# Patient Record
Sex: MALE | Race: WHITE | NOT HISPANIC OR LATINO | Employment: OTHER | URBAN - METROPOLITAN AREA
[De-identification: names, ages, dates, MRNs, and addresses within clinical notes are randomized per-mention and may not be internally consistent; named-entity substitution may affect disease eponyms.]

---

## 2017-07-22 ENCOUNTER — HOSPITAL ENCOUNTER (EMERGENCY)
Facility: HOSPITAL | Age: 42
Discharge: HOME | End: 2017-07-22
Attending: EMERGENCY MEDICINE
Payer: COMMERCIAL

## 2017-07-22 VITALS
BODY MASS INDEX: 36.49 KG/M2 | OXYGEN SATURATION: 98 % | SYSTOLIC BLOOD PRESSURE: 142 MMHG | RESPIRATION RATE: 16 BRPM | HEART RATE: 84 BPM | HEIGHT: 65 IN | DIASTOLIC BLOOD PRESSURE: 84 MMHG | TEMPERATURE: 99 F | WEIGHT: 219 LBS

## 2017-07-22 DIAGNOSIS — R29.818 NEUROLOGIC ABNORMALITY: ICD-10-CM

## 2017-07-22 DIAGNOSIS — R21 SKIN RASH: Primary | ICD-10-CM

## 2017-07-22 DIAGNOSIS — R52 PAIN: ICD-10-CM

## 2017-07-22 DIAGNOSIS — R41.82 ALTERED MENTAL STATE: ICD-10-CM

## 2017-07-22 LAB
ALBUMIN SERPL BCP-MCNC: 3.4 G/DL
ALP SERPL-CCNC: 90 U/L
ALT SERPL W/O P-5'-P-CCNC: 31 U/L
AMPHET+METHAMPHET UR QL: NORMAL
ANION GAP SERPL CALC-SCNC: 8 MMOL/L
AST SERPL-CCNC: 20 U/L
BACTERIA #/AREA URNS HPF: NORMAL /HPF
BARBITURATES UR QL SCN>200 NG/ML: NEGATIVE
BASOPHILS # BLD AUTO: 0.02 K/UL
BASOPHILS NFR BLD: 0.3 %
BENZODIAZ UR QL SCN>200 NG/ML: NORMAL
BILIRUB SERPL-MCNC: 0.2 MG/DL
BILIRUB UR QL STRIP: NEGATIVE
BUN SERPL-MCNC: 17 MG/DL
BZE UR QL SCN: NEGATIVE
CALCIUM SERPL-MCNC: 9 MG/DL
CANNABINOIDS UR QL SCN: NEGATIVE
CHLORIDE SERPL-SCNC: 105 MMOL/L
CLARITY UR: CLEAR
CO2 SERPL-SCNC: 29 MMOL/L
COLOR UR: YELLOW
CREAT SERPL-MCNC: 1.1 MG/DL
CREAT UR-MCNC: 141.3 MG/DL
DIFFERENTIAL METHOD: ABNORMAL
EOSINOPHIL # BLD AUTO: 0.1 K/UL
EOSINOPHIL NFR BLD: 2 %
ERYTHROCYTE [DISTWIDTH] IN BLOOD BY AUTOMATED COUNT: 13.4 %
EST. GFR  (AFRICAN AMERICAN): >60 ML/MIN/1.73 M^2
EST. GFR  (NON AFRICAN AMERICAN): >60 ML/MIN/1.73 M^2
GLUCOSE SERPL-MCNC: 89 MG/DL
GLUCOSE UR QL STRIP: NEGATIVE
HCT VFR BLD AUTO: 36 %
HGB BLD-MCNC: 13 G/DL
HGB UR QL STRIP: NEGATIVE
HIV1+2 IGG SERPL QL IA.RAPID: NEGATIVE
KETONES UR QL STRIP: NEGATIVE
LEUKOCYTE ESTERASE UR QL STRIP: ABNORMAL
LYMPHOCYTES # BLD AUTO: 2 K/UL
LYMPHOCYTES NFR BLD: 31.3 %
MCH RBC QN AUTO: 31.6 PG
MCHC RBC AUTO-ENTMCNC: 36.1 G/DL
MCV RBC AUTO: 88 FL
METHADONE UR QL SCN>300 NG/ML: NORMAL
MICROSCOPIC COMMENT: NORMAL
MONOCYTES # BLD AUTO: 0.6 K/UL
MONOCYTES NFR BLD: 8.8 %
NEUTROPHILS # BLD AUTO: 3.7 K/UL
NEUTROPHILS NFR BLD: 58.4 %
NITRITE UR QL STRIP: NEGATIVE
OPIATES UR QL SCN: NORMAL
PCP UR QL SCN>25 NG/ML: NEGATIVE
PH UR STRIP: 6 [PH] (ref 5–8)
PLATELET # BLD AUTO: 121 K/UL
PLATELET BLD QL SMEAR: ABNORMAL
PMV BLD AUTO: 10 FL
POTASSIUM SERPL-SCNC: 3.5 MMOL/L
PROT SERPL-MCNC: 7 G/DL
PROT UR QL STRIP: NEGATIVE
RBC # BLD AUTO: 4.11 M/UL
RBC #/AREA URNS HPF: 0 /HPF (ref 0–4)
RPR SER QL: NORMAL
SODIUM SERPL-SCNC: 142 MMOL/L
SP GR UR STRIP: 1.02 (ref 1–1.03)
SQUAMOUS #/AREA URNS HPF: 0 /HPF
STOMATOCYTES BLD QL SMEAR: PRESENT
TOXICOLOGY INFORMATION: NORMAL
URN SPEC COLLECT METH UR: ABNORMAL
UROBILINOGEN UR STRIP-ACNC: NEGATIVE EU/DL
WBC # BLD AUTO: 6.48 K/UL
WBC #/AREA URNS HPF: 0 /HPF (ref 0–5)

## 2017-07-22 PROCEDURE — 86703 HIV-1/HIV-2 1 RESULT ANTBDY: CPT

## 2017-07-22 PROCEDURE — 80061 LIPID PANEL: CPT

## 2017-07-22 PROCEDURE — 86592 SYPHILIS TEST NON-TREP QUAL: CPT

## 2017-07-22 PROCEDURE — 80053 COMPREHEN METABOLIC PANEL: CPT

## 2017-07-22 PROCEDURE — 85025 COMPLETE CBC W/AUTO DIFF WBC: CPT

## 2017-07-22 PROCEDURE — 81000 URINALYSIS NONAUTO W/SCOPE: CPT

## 2017-07-22 PROCEDURE — 25500020 PHARM REV CODE 255: Performed by: EMERGENCY MEDICINE

## 2017-07-22 PROCEDURE — 99284 EMERGENCY DEPT VISIT MOD MDM: CPT

## 2017-07-22 PROCEDURE — 80307 DRUG TEST PRSMV CHEM ANLYZR: CPT

## 2017-07-22 RX ORDER — OMEPRAZOLE 20 MG/1
20 CAPSULE, DELAYED RELEASE ORAL DAILY
COMMUNITY

## 2017-07-22 RX ORDER — OXYCODONE HCL 10 MG/1
10 TABLET, FILM COATED, EXTENDED RELEASE ORAL EVERY 4 HOURS PRN
COMMUNITY

## 2017-07-22 RX ORDER — CITALOPRAM 20 MG/1
20 TABLET, FILM COATED ORAL 2 TIMES DAILY
COMMUNITY

## 2017-07-22 RX ORDER — DEXTROAMPHETAMINE SACCHARATE, AMPHETAMINE ASPARTATE MONOHYDRATE, DEXTROAMPHETAMINE SULFATE AND AMPHETAMINE SULFATE 7.5; 7.5; 7.5; 7.5 MG/1; MG/1; MG/1; MG/1
30 CAPSULE, EXTENDED RELEASE ORAL 2 TIMES DAILY
COMMUNITY

## 2017-07-22 RX ORDER — LAMOTRIGINE 100 MG/1
100 TABLET ORAL 2 TIMES DAILY
COMMUNITY

## 2017-07-22 RX ORDER — ALPRAZOLAM 1 MG/1
1 TABLET ORAL DAILY PRN
COMMUNITY

## 2017-07-22 RX ADMIN — IOHEXOL 75 ML: 350 INJECTION, SOLUTION INTRAVENOUS at 12:07

## 2017-07-22 NOTE — ED NOTES
Pt stated that he lives in [place] and one year ago thorns on a bromeliad plant cut his fingers and the plant also had slugs on it. Soon after he began having neurological symptoms and wounds to lower left ext, bilat arms, right ear and scalp. Had wound vac placed to left lower leg at that time. Pt stated symptoms continue

## 2017-07-22 NOTE — ED PROVIDER NOTES
"SCRIBE #1 NOTE: I, Corinne Mack, am scribing for, and in the presence of, Jose Angel Blanchard Jr., MD. I have scribed the entire note.      History      Chief Complaint   Patient presents with    Abdominal Pain     patient c/o abdominal pain, diarrhea, bilateral arm rash, old wound to left ankle. Patient states he lives in [place] and has been battling infection for the last year, and referred to ED by Dr. Blanchard        Review of patient's allergies indicates:   Allergen Reactions    Statins-hmg-coa reductase inhibitors Other (See Comments)     MUSCLE PAIN         HPI   HPI    7/22/2017, 10:24 AM   History obtained from the patient      History of Present Illness: Abdon Rojas is a 42 y.o. male patient who presents to the Emergency Department for wound check. Pt states he was clearing his yard in [place] a year ago and cut his lower L leg. Since then whenever the pt experiencing a skin wound it ulcerates and itches. Pt describes seeing "black things" and "white worms" in his blood. Pt also reports episodic neurological sxs such as change in depth perception. Pt was advised by his NP to go to the "[facility]" to seek further medical help. Symptoms are constant and moderate in severity. No mitigating or exacerbating factors reported. Associated sxs include abd pain, diarrhea, BUE rash, and L foot pain. Patient denies any fever, chills, CP, SOB, N//V, back pain, HA, dizziness, and all other sxs at this time. No prior Tx reported. Pt has Hx of HTN, ADD, bipolar disorder, and depression. No further complaints or concerns at this time.       Arrival mode: Personal vehicle      PCP: [facility]       Past Medical History:  Past Medical History:   Diagnosis Date    ADD (attention deficit disorder)     Bipolar 1 disorder     Depression     GERD (gastroesophageal reflux disease)     Hypertension     Social anxiety disorder        Past Surgical History:  Past Surgical History:   Procedure Laterality Date    BACK " SURGERY      left shoulder surgery  2009         Family History:  No family history on file.    Social History:  Social History     Social History Main Topics    Smoking status: Current Every Day Smoker     Types: Vaping with nicotine    Smokeless tobacco: Never Used    Alcohol use No    Drug use:      Types: Marijuana    Sexual activity: Not on file       ROS   Review of Systems   Constitutional: Negative for chills and fever.   Respiratory: Negative for cough and shortness of breath.    Cardiovascular: Negative for chest pain and leg swelling.   Gastrointestinal: Positive for abdominal pain and diarrhea (mucousy). Negative for nausea and vomiting.   Musculoskeletal: Negative for back pain, neck pain and neck stiffness.        (+)  L foot pain   Skin: Positive for wound (old; BUE). Negative for rash.   Neurological: Negative for dizziness, light-headedness, numbness and headaches.   All other systems reviewed and are negative.    Physical Exam      Initial Vitals [07/22/17 0904]   BP Pulse Resp Temp SpO2   (!) 163/95 85 18 98.2 °F (36.8 °C) 99 %      MAP       117.67          Physical Exam  Nursing Notes and Vital Signs Reviewed.  Constitutional: Patient is in no acute distress. Well-developed and well-nourished.  Head: Atraumatic. Normocephalic.  Eyes: PERRL. EOM intact. Conjunctivae are not pale. No scleral icterus.  ENT: Mucous membranes are moist. Oropharynx is clear and symmetric.    Neck: Supple. Full ROM. No lymphadenopathy.  Cardiovascular: Regular rate. Regular rhythm. No murmurs, rubs, or gallops. Distal pulses are 2+ and symmetric.  Pulmonary/Chest: No respiratory distress. Clear to auscultation bilaterally. No wheezing, rales, or rhonchi.  Abdominal: Soft and non-distended.  There is no tenderness.   Musculoskeletal: Moves all extremities. No obvious deformities. 1+ edema to BLE. No calf tenderness.  Skin: Warm and dry.  Multiple areas of scarification to bilateral forearms. 1 area of  "scarification to L shin.  Neurological:  Alert, awake, and appropriate.  Normal speech.  No acute focal neurological deficits are appreciated.  Psychiatric: Normal affect. Good eye contact. Appropriate in content.    ED Course    Procedures  ED Vital Signs:  Vitals:    07/22/17 0904 07/22/17 1021 07/22/17 1303 07/22/17 1346   BP: (!) 163/95 (!) 148/84 (!) 142/84    Pulse: 85   84   Resp: 18  16 16   Temp: 98.2 °F (36.8 °C)  98.6 °F (37 °C)    TempSrc: Oral      SpO2: 99% 98% 98%    Weight: 99.3 kg (219 lb)      Height: 5' 5" (1.651 m)          Abnormal Lab Results:  Labs Reviewed   CBC W/ AUTO DIFFERENTIAL - Abnormal; Notable for the following:        Result Value    RBC 4.11 (*)     Hemoglobin 13.0 (*)     Hematocrit 36.0 (*)     MCH 31.6 (*)     MCHC 36.1 (*)     Platelets 121 (*)     Platelet Estimate Clumped (*)     All other components within normal limits   COMPREHENSIVE METABOLIC PANEL - Abnormal; Notable for the following:     Albumin 3.4 (*)     All other components within normal limits   URINALYSIS - Abnormal; Notable for the following:     Leukocytes, UA Trace (*)     All other components within normal limits   CULTURE, STOOL   RAPID HIV   RPR   DRUG SCREEN PANEL, URINE EMERGENCY   URINALYSIS MICROSCOPIC   STOOL EXAM-OVA,CYSTS,PARASITES   LIPID PANEL        All Lab Results:  Results for orders placed or performed during the hospital encounter of 07/22/17   CBC auto differential   Result Value Ref Range    WBC 6.48 3.90 - 12.70 K/uL    RBC 4.11 (L) 4.60 - 6.20 M/uL    Hemoglobin 13.0 (L) 14.0 - 18.0 g/dL    Hematocrit 36.0 (L) 40.0 - 54.0 %    MCV 88 82 - 98 fL    MCH 31.6 (H) 27.0 - 31.0 pg    MCHC 36.1 (H) 32.0 - 36.0 g/dL    RDW 13.4 11.5 - 14.5 %    Platelets 121 (L) 150 - 350 K/uL    MPV 10.0 9.2 - 12.9 fL    Gran # 3.7 1.8 - 7.7 K/uL    Lymph # 2.0 1.0 - 4.8 K/uL    Mono # 0.6 0.3 - 1.0 K/uL    Eos # 0.1 0.0 - 0.5 K/uL    Baso # 0.02 0.00 - 0.20 K/uL    Gran% 58.4 38.0 - 73.0 %    Lymph% 31.3 18.0 - " 48.0 %    Mono% 8.8 4.0 - 15.0 %    Eosinophil% 2.0 0.0 - 8.0 %    Basophil% 0.3 0.0 - 1.9 %    Platelet Estimate Clumped (A)     Stomatocytes Present     Differential Method Automated    Comprehensive metabolic panel   Result Value Ref Range    Sodium 142 136 - 145 mmol/L    Potassium 3.5 3.5 - 5.1 mmol/L    Chloride 105 95 - 110 mmol/L    CO2 29 23 - 29 mmol/L    Glucose 89 70 - 110 mg/dL    BUN, Bld 17 6 - 20 mg/dL    Creatinine 1.1 0.5 - 1.4 mg/dL    Calcium 9.0 8.7 - 10.5 mg/dL    Total Protein 7.0 6.0 - 8.4 g/dL    Albumin 3.4 (L) 3.5 - 5.2 g/dL    Total Bilirubin 0.2 0.1 - 1.0 mg/dL    Alkaline Phosphatase 90 55 - 135 U/L    AST 20 10 - 40 U/L    ALT 31 10 - 44 U/L    Anion Gap 8 8 - 16 mmol/L    eGFR if African American >60 >60 mL/min/1.73 m^2    eGFR if non African American >60 >60 mL/min/1.73 m^2   Rapid HIV   Result Value Ref Range    HIV Rapid Testing Negative Negative   RPR   Result Value Ref Range    RPR Non-reactive Non-reactive   Drug screen panel, emergency   Result Value Ref Range    Benzodiazepines Presumptive Positive     Methadone metabolites Presumptive Positive     Cocaine (Metab.) Negative     Opiate Scrn, Ur Presumptive Positive     Barbiturate Screen, Ur Negative     Amphetamine Screen, Ur Presumptive Positive     THC Negative     Phencyclidine Negative     Creatinine, Random Ur 141.3 23.0 - 375.0 mg/dL    Toxicology Information SEE COMMENT    Urinalysis   Result Value Ref Range    Specimen UA Urine, Clean Catch     Color, UA Yellow Yellow, Straw, Abby    Appearance, UA Clear Clear    pH, UA 6.0 5.0 - 8.0    Specific Gravity, UA 1.020 1.005 - 1.030    Protein, UA Negative Negative    Glucose, UA Negative Negative    Ketones, UA Negative Negative    Bilirubin (UA) Negative Negative    Occult Blood UA Negative Negative    Nitrite, UA Negative Negative    Urobilinogen, UA Negative <2.0 EU/dL    Leukocytes, UA Trace (A) Negative   Urinalysis Microscopic   Result Value Ref Range    RBC, UA 0 0  - 4 /hpf    WBC, UA 0 0 - 5 /hpf    Bacteria, UA None None-Occ /hpf    Squam Epithel, UA 0 /hpf    Microscopic Comment SEE COMMENT        Imaging Results:  Imaging Results          CT Abdomen Pelvis With Contrast (Final result)  Result time 07/22/17 13:00:17    Final result by German Bunn Jr., MD (07/22/17 13:00:17)                 Impression:      1. There is hepatomegaly with fatty infiltration of the liver.    2.  Hyperenhancing focus adjacent to the gallbladder fossa demonstrates a suggestion of peripheral nodular enhancement and is favored to represent a benign hemangioma.  This may be confirmed with non-emergent MRI evaluation.    3.  Grade 2 spondylolisthesis of L4 on L5.        Electronically signed by: GERMAN BUNN M.D.  Date:     07/22/17  Time:    13:00              Narrative:    CT ABDOMEN PELVIS WITH CONTRAST    Clinical history: pain    TECHNIQUE: Routine abdominal and pelvic CT performed after the IV administration of 75 mL Omnipaque 350. Coronal and sagittal images obtained. All CT scans at this facility use dose modulation, iterative reconstruction, and/or weight based dosing when appropriate to reduce radiation dose to as low as reasonably achievable.    Comparison: None    Findings: No acute disease is seen in the lung bases.  There is grade 2 anterolisthesis of L4 and L5 with a right unilateral pars defect and degenerative facet change.     The distal esophagus and stomach are not unusual in CT appearance.  Small bowel loops and large bowel loops are also unremarkable. No evidence of appendicitis. No free intraperitoneal fluid, free air or abscess identified.    There is hepatomegaly with fatty infiltration of the liver.  There is an irregular, enhancing focus adjacent to the gallbladder fossa.  On coronal reconstructed series this structure demonstrates peripheral nodular enhancement. The gallbladder and bile ducts are unremarkable. The pancreas, spleen and adrenals are unremarkable. No  aortic aneurysm is identified. The kidneys and ureters are unremarkable. The bladder is within normal limits. Reproductive organs are not unusual in CT appearance. No pathologically enlarged lymph nodes are identified.                             CT Head Without Contrast (Final result)  Result time 07/22/17 11:04:22    Final result by Seth Roth MD (07/22/17 11:04:22)                 Impression:         Normal CT of the head.    All CT scans at this facility use dose modulation, iterative reconstruction, and/or weight based dosing when appropriate to reduce radiation dose to as low as reasonably achievable.       Electronically signed by: SETH ROTH MD  Date:     07/22/17  Time:    11:04              Narrative:    Exam: CT scan of the head without contrast    Clinical History:  R41.82 Altered mental status, unspecified..      Findings:    No hydrocephalus, midline shift, mass effect, or acute intracranial hemorrhage. The gray-white matter differentiation is normal. CSF spaces are normal. The visualized sinuses and mastoid air cells are clear. No osseous abnormality.                             X-Ray Chest PA And Lateral (Final result)  Result time 07/22/17 11:02:27    Final result by German Bunn Jr., MD (07/22/17 11:02:27)                 Impression:     Negative two-view chest.       Electronically signed by: GERMAN BUNN M.D.  Date:     07/22/17  Time:    11:02              Narrative:    EXAM: [id number] CHEST PA AND LATERAL    CLINICAL HISTORY: Chest pain.    COMPARISON: None available.    FINDINGS: The heart size is normal. The lung fields are clear. No acute process is identified.                             The EKG was ordered, reviewed, and independently interpreted by the ED provider.  Interpretation time: 1131  Rate: 75 BPM  Rhythm: normal sinus rhythm  Interpretation: Incomplete R bundle branch block.  Minimal voltage criteria for LVH. Borderline ECG. No STEMI.         The Emergency Provider reviewed  the vital signs and test results, which are outlined above.    ED Discussion     1:17 PM: Reassessed pt at this time. Pt is awake, alert, and in no distress. Discussed with pt all pertinent ED information and results. Discussed pt dx and plan of tx. Gave pt all f/u and return to the ED instructions. All questions and concerns were addressed at this time. Pt expresses understanding of information and instructions, and is comfortable with plan to discharge. Pt is stable for discharge.    I discussed with patient and/or family/caretaker that evaluation in the ED does not suggest any emergent or life threatening medical conditions requiring immediate intervention beyond what was provided in the ED, and I believe patient is safe for discharge.  Regardless, an unremarkable evaluation in the ED does not preclude the development or presence of a serious of life threatening condition. As such, patient was instructed to return immediately for any worsening or change in current symptoms.      ED Medication(s):  Medications   omnipaque 350 iohexol 75 mL (75 mLs Intravenous Given 7/22/17 1252)       Discharge Medication List as of 7/22/2017  1:16 PM          Follow-up Information     Guy Desir MD. Call in 1 day.    Specialty:  Infectious Diseases  Contact information:  [address] [facility] [address]  [phone]             [facility] - .    Specialty:  Emergency Medicine  Why:  If symptoms worsen  Contact information:  [address] [facility] [id number] [phone]                   Medical Decision Making    Medical Decision Making:   Clinical Tests:   Lab Tests: Ordered and Reviewed  Radiological Study: Ordered and Reviewed           Scribe Attestation:   Scribe #1: I performed the above scribed service and the documentation accurately describes the services I performed. I attest to the accuracy of the note.    Attending:   Physician Attestation Statement for  Scribe #1: I, Jose Angel Blanchard Jr., MD, personally performed the services described in this documentation, as scribed by Corinne Mack, in my presence, and it is both accurate and complete.          Clinical Impression       ICD-10-CM ICD-9-CM   1. Skin rash R21 782.1   2. Pain R52 780.96   3. Altered mental state R41.82 780.97   4. Neurologic abnormality R29.818 781.99       Disposition:   Disposition: Discharged  Condition: Stable         Jose Angel Blanchard Jr., MD  07/22/17 1547

## 2017-07-23 LAB
CHOLEST/HDLC SERPL: 6.2 {RATIO}
HDL/CHOLESTEROL RATIO: 16.1 %
HDLC SERPL-MCNC: 192 MG/DL
HDLC SERPL-MCNC: 31 MG/DL
LDLC SERPL CALC-MCNC: 86.2 MG/DL
NONHDLC SERPL-MCNC: 161 MG/DL
TRIGL SERPL-MCNC: 374 MG/DL

## 2017-08-08 PROBLEM — Z83.1: Status: ACTIVE | Noted: 2017-08-08

## 2017-08-08 PROBLEM — R21 RASH AND NONSPECIFIC SKIN ERUPTION: Status: ACTIVE | Noted: 2017-08-08

## 2017-08-08 PROBLEM — I10 ESSENTIAL HYPERTENSION: Status: ACTIVE | Noted: 2017-08-08
